# Patient Record
Sex: MALE
[De-identification: names, ages, dates, MRNs, and addresses within clinical notes are randomized per-mention and may not be internally consistent; named-entity substitution may affect disease eponyms.]

---

## 2018-11-12 ENCOUNTER — HOSPITAL ENCOUNTER (EMERGENCY)
Dept: HOSPITAL 14 - H.ER | Age: 83
Discharge: HOME | End: 2018-11-12
Payer: MEDICARE

## 2018-11-12 VITALS
RESPIRATION RATE: 18 BRPM | OXYGEN SATURATION: 98 % | SYSTOLIC BLOOD PRESSURE: 150 MMHG | TEMPERATURE: 96.7 F | HEART RATE: 75 BPM | DIASTOLIC BLOOD PRESSURE: 79 MMHG

## 2018-11-12 DIAGNOSIS — Z79.01: ICD-10-CM

## 2018-11-12 DIAGNOSIS — F03.90: ICD-10-CM

## 2018-11-12 DIAGNOSIS — S82.001A: Primary | ICD-10-CM
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
## 2018-11-12 LAB
ALBUMIN SERPL-MCNC: 4.2 G/DL (ref 3.5–5)
ALBUMIN/GLOB SERPL: 1.4 {RATIO} (ref 1–2.1)
ALT SERPL-CCNC: 45 U/L (ref 21–72)
APTT BLD: 45.2 SECONDS (ref 25.6–37.1)
AST SERPL-CCNC: 40 U/L (ref 17–59)
BASOPHILS # BLD AUTO: 0 K/UL (ref 0–0.2)
BASOPHILS NFR BLD: 0.7 % (ref 0–2)
BUN SERPL-MCNC: 25 MG/DL (ref 9–20)
CALCIUM SERPL-MCNC: 9.8 MG/DL (ref 8.4–10.2)
EOSINOPHIL # BLD AUTO: 0.1 K/UL (ref 0–0.7)
EOSINOPHIL NFR BLD: 1.6 % (ref 0–4)
ERYTHROCYTE [DISTWIDTH] IN BLOOD BY AUTOMATED COUNT: 15.8 % (ref 11.5–14.5)
GFR NON-AFRICAN AMERICAN: 48
HGB BLD-MCNC: 12.5 G/DL (ref 12–18)
INR PPP: 2.8
LYMPHOCYTES # BLD AUTO: 1.3 K/UL (ref 1–4.3)
LYMPHOCYTES NFR BLD AUTO: 19.3 % (ref 20–40)
MCH RBC QN AUTO: 29.4 PG (ref 27–31)
MCHC RBC AUTO-ENTMCNC: 32.6 G/DL (ref 33–37)
MCV RBC AUTO: 90.1 FL (ref 80–94)
MONOCYTES # BLD: 0.8 K/UL (ref 0–0.8)
MONOCYTES NFR BLD: 11.9 % (ref 0–10)
NEUTROPHILS # BLD: 4.5 K/UL (ref 1.8–7)
NEUTROPHILS NFR BLD AUTO: 66.5 % (ref 50–75)
NRBC BLD AUTO-RTO: 0 % (ref 0–0)
PLATELET # BLD: 146 K/UL (ref 130–400)
PMV BLD AUTO: 10.1 FL (ref 7.2–11.7)
PROTHROMBIN TIME: 32 SECONDS (ref 9.8–13.1)
RBC # BLD AUTO: 4.26 MIL/UL (ref 4.4–5.9)
WBC # BLD AUTO: 6.8 K/UL (ref 4.8–10.8)

## 2018-11-12 NOTE — ED PDOC
HPI: Trauma/Fall





- HPI


Time Seen by Provider: 11/12/18 15:51


Chief Complaint (Nursing): Trauma


Chief Complaint (Provider): Trauma


History Per: Patient


History/Exam Limitations: no limitations


Onset/Duration Of Symptoms: Days (x7)


Injury Occurred (Timing): Days Ago: (7)


Location Of Injury: Right: Knee, Anterior: Face


Additional Complaint(s): 





87 year old male with a history of hypertension, diabetes, mild dementia and 

atrial fibrillation presents to the ED s/p fall that happened a week ago. 

Patient reports he tripped, fell on stairs, and his head and right knee. Since 

then, he has had bruising to the face as well as knee. However, the knee has not

improved since fall. He has also developed severe swelling that extends from 

knee superiorly into thigh. Patient has difficulty maintaining extension at 

knee, but denies any numbness, difficulty speaking, headache or any other 

medical complaints. He saw his PMD today, who sent him here for further 

evaluation. 





PMD: Dr. Cerda 


Home medications: coumadin





- Fall


Fall:Prior To Injury: Tripped





Past Medical History


Reviewed: Historical Data, Nursing Documentation, Vital Signs


Vital Signs: 





                                Last Vital Signs











Temp  96.7 F L  11/12/18 15:39


 


Pulse  75   11/12/18 15:39


 


Resp  18   11/12/18 15:39


 


BP  150/79   11/12/18 15:39


 


Pulse Ox  98   11/12/18 15:39














- Medical History


PMH: Atrial Fibrillation, Dementia (mild), Diabetes, HTN





- Surgical History


Surgical History: No Surg Hx





- Family History


Family History: States: No Known Family Hx





- Social History


Current smoker - smoking cessation education provided: No


Ex-Smoker (has not smoked in the last 12 months): No


Alcohol: None


Drugs: Denies





- Home Medications


Home Medications: 


                                Ambulatory Orders











 Medication  Instructions  Recorded


 


Lidocaine 5% [Lidoderm] 1 ea TD DAILY PRN #30 patch 11/12/18


 


RX: Acetaminophen [Tylenol Extra 1,000 mg PO Q6 PRN #100 tablet 11/12/18





Strength]  














- Allergies


Allergies/Adverse Reactions: 


                                    Allergies











Allergy/AdvReac Type Severity Reaction Status Date / Time


 


No Known Allergies Allergy   Verified 11/12/18 15:38














Review of Systems


ROS Statement: Except As Marked, All Systems Reviewed And Found Negative


Musculoskeletal: Positive for: Other (head injury and right knee injury)





Physical Exam





- Reviewed


Nursing Documentation Reviewed: Yes


Vital Signs Reviewed: Yes





- Physical Exam


Appears: Positive for: In Acute Distress (mild painful )


Head Exam: Positive for: NORMOCEPHALIC.  Negative for: ATRAUMATIC


Skin: Positive for: Warm


Eye Exam: Positive for: EOMI, PERRL


Neck: Negative for: Normal (large ecchymosis on right anterior neck, no 

tenderness to palpation, no fluctuance, full ROM )


Cardiovascular/Chest: Positive for: Regular Rate, Rhythm.  Negative for: Murmur


Respiratory: Positive for: Normal Breath Sounds.  Negative for: Respiratory 

Distress


Gastrointestinal/Abdominal: Positive for: Soft.  Negative for: Tenderness


Back: Positive for: Normal Inspection.  Negative for: Decreased ROM


Extremity: Positive for: Other (Right lower extremity: Diffuse edema of the 

right anterior knee extending up to lower anterior thigh with large ecchymosis 

and tenderness to palpation, neurovascularly intact distally )


Lymphatic: Negative for: Adenopathy


Neurologic/Psych: Positive for: Alert.  Negative for: Motor/Sensory Deficits





- Laboratory Results


Result Diagrams: 


                                 11/12/18 17:20





                                 11/12/18 17:20





- ECG


O2 Sat by Pulse Oximetry: 98 (RA)


Pulse Ox Interpretation: Normal





Medical Decision Making


Medical Decision Making: 





Time: 1604


Initial Impression: Fall, right knee injury and head injury


Differential diagnoses include but are not limited to: Knee contusion, 

hamartoma, coumadin toxicity, traumatic brain injury, knee or femur fracture


Initial Plan:


--Type and screen


--CT ext lower w/o contrast right 


--CT head w/o contrast


--CMP


--CBC with differentials


--PTT


--PT


--Right knee XR


--Right femur XR





Time: 1644


CT Head:


FINDINGS:





HEMORRHAGE:


No intracranial hemorrhage. 





BRAIN:


No mass effect or edema.  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





Cortical and cerebellar atrophy, periventricular small vessel disease. 





VENTRICLES:


Unremarkable. No hydrocephalus. 





CALVARIUM:


Unremarkable.





PARANASAL SINUSES:


Unremarkable as visualized. No significant inflammatory changes.





MASTOID AIR CELLS:


Unremarkable as visualized. No inflammatory changes.





OTHER FINDINGS:


None.





IMPRESSION:


No acute intracranial abnormalities. No significant findings to account for the 

clinical presentation.








Time: 1720


CT Lower Extremity:


FINDINGS:


There is a vertical, articular fracture through the lateral portion of the 

patella, nondisplaced.  No additional fractures region although extensive 

degenerative heterotopic calcification is seen related to the superficial 

patella, medial lateral menisci as well as more peripheral portions of the 

medial lateral femorotibial joint compartments.





No subluxation or dislocation.  No underlying destructive bony lesion 

appreciable.  The proximal visualized fibula appears intact.  Diffuse osteopenia

suggests osteoporosis. Advanced degenerative joint disease seen throughout all 3

compartments including joint space narrowing, cortical sclerosis and osteophyte 

development. 





There is a moderate hemarthrosis at the suprapatellar bursa as well as medial 

and lateral femorotibial joint compartments.  No obvious reactive changes seen 

surrounding the popliteal artery which is atherosclerotic.  Prominent anterior 

knee soft tissue edema is appreciated along the dermis and subcutaneous 

distribution.





IMPRESSION:


There is a nondisplaced vertical, articular fracture through the lateral patella

with moderate hemarthrosis as discussed above. Advanced degenerative changes are

appreciated without additional fracture appreciable. No dislocation or 

subluxation evident.








Time: 1810


Right Femur XR:


FINDINGS:


No acute osseous or articular abnormalities are visible.





Soft tissue swelling about the knee.





IMPRESSION:


Soft tissue swelling.  Known fracture of the patella is not apparent on the 

current study.








Time: 1812


Right Knee XR


FINDINGS:





BONES:


Vertically oriented fracture through the lateral aspect of the patella. 





JOINTS:


Degenerative changes.  Evidence of chondrocalcinosis. 





JOINT EFFUSION:


Suprapatellar joint effusion. 





Evidence of chondromalacia patella. 





OTHER FINDINGS:


Soft tissue swelling attests to the acuity of the fracture.





IMPRESSION:


Acute fracture of the patella.  The findings are better visualized on the 

concurrent CT of the right knee.





Time: 1933


--Case discussed with Dr. Cerda, patient to be referred to Dr. Morales. Knee 

immobilization provided. Patient is stable for discharge home. 





-----------------------------------------

-------------------------------------------


Scribe Attestation: 


Documented by Babs Zuluaga, acting as a scribe for Angelina Mauro MD





Provider Scribe Attestation:


All medical record entries made by the Scribe were at my direction and 

personally dictated by me. I have reviewed the chart and agree that the record 

accurately reflects my personal performance of the history, physical exam, 

medical decision making, and the department course for this patient. I have also

personally directed, reviewed, and agree with the discharge instructions and 

disposition.





Disposition





- Clinical Impression


Clinical Impression: 


 Patella fracture








- Disposition


Referrals: 


El Morales III, MD [Staff Provider] - 11/13/18


Disposition Time: 19:33


Condition: STABLE


Additional Instructions: 


REST, ICE, IMMOBILIZATION, COMPRESSION AND ELEVATION





AVOID EXCESSIVE WALKING OR STRENUOUS ACTIVITY





CALL DR MORALES TOMORROW TO ARRANGE APPOINTMENT BY THE END OF WEEK





USE KNEE IMMOBILIZER EXCEPT FOR WHEN BATHING. USE IT UNTIL YOU FOLLOW UP WITH 

ORTHOPEDIST.


Prescriptions: 


RX: Acetaminophen [Tylenol Extra Strength] 1,000 mg PO Q6 PRN #100 tablet


 PRN Reason: FEVER OR PAIN


Lidocaine 5% [Lidoderm] 1 ea TD DAILY PRN #30 patch


 PRN Reason: PAIN


Instructions:  Knee Immobilizer (DC), Patella Fracture (DC)


Forms:  [employer] (English)

## 2018-11-12 NOTE — RAD
Date of service: 



11/12/2018



PROCEDURE:  Right femur



HISTORY:

fall large hematoma



COMPARISON:

November 12, 2018 right knee reported separately



TECHNIQUE:

Standard protocol for this study/examination.



FINDINGS:

No acute osseous or articular abnormalities are visible.



Soft tissue swelling about the knee.



IMPRESSION:

Soft tissue swelling.  Known fracture of the patella is not apparent 

on the current study.

## 2018-11-12 NOTE — RAD
Date of service: 



11/12/2018



PROCEDURE:  Right Knee Radiographs.



HISTORY:

fall large hematoma



COMPARISON:

November 12, 2018 CT right knee



FINDINGS:



BONES:

Vertically oriented fracture through the lateral aspect of the 

patella. 



JOINTS:

Degenerative changes.  Evidence of chondrocalcinosis. 



JOINT EFFUSION:

Suprapatellar joint effusion. 



Evidence of chondromalacia patella. 



OTHER FINDINGS:

Soft tissue swelling attests to the acuity of the fracture.



IMPRESSION:

Acute fracture of the patella.  The findings are better visualized on 

the concurrent CT of the right knee.

## 2018-11-12 NOTE — CT
Date of service: 



11/12/2018



PROCEDURE:  RIGHT KNEE CT WITHOUT CONTRAST



HISTORY:

large knee edema postfall r/o hemarthroma



COMPARISON:

NONE AVAILABLE.



TECHNIQUE:

A volumetric CT acquisition through the right knee was performed 

without intravenous contrast with reformatted disease as revised add 

in multiple projections by various algorithms.



Contrast Dose: None



Radiation dose:Total exam DLP = 278.29 mGy-cm.



This CT exam was performed using one or more of the following dose 

reduction techniques: Automated exposure control, adjustment of the 

mA and/or kV according to patient size, and/or use of iterative 

reconstruction technique.







FINDINGS:

There is a vertical, articular fracture through the lateral portion 

of the patella, nondisplaced.  No additional fractures region 

although extensive degenerative heterotopic calcification is seen 

related to the superficial patella, medial lateral menisci as well as 

more peripheral portions of the medial lateral femorotibial joint 

compartments.



No subluxation or dislocation.  No underlying destructive bony lesion 

appreciable.  The proximal visualized fibula appears intact.  Diffuse 

osteopenia suggests osteoporosis. Advanced degenerative joint disease 

seen throughout all 3 compartments including joint space narrowing, 

cortical sclerosis and osteophyte development. 



There is a moderate hemarthrosis at the suprapatellar bursa as well 

as medial and lateral femorotibial joint compartments.  No obvious 

reactive changes seen surrounding the popliteal artery which is 

atherosclerotic.  Prominent anterior knee soft tissue edema is 

appreciated along the dermis and subcutaneous distribution.



IMPRESSION:

There is a nondisplaced vertical, articular fracture through the 

lateral patella with moderate hemarthrosis as discussed above. 

Advanced degenerative changes are appreciated without additional 

fracture appreciable. No dislocation or subluxation evident.

## 2018-11-12 NOTE — CT
Date of service: 



11/12/2018



PROCEDURE:  CT HEAD WITHOUT CONTRAST.



HISTORY:

fall head injury on coumadin



COMPARISON:

None available.



TECHNIQUE:

Axial computed tomography images were obtained through the head/brain 

without intravenous contrast.  



Supplemental Coronal and Sagittal projections created and reviewed.



Radiation dose:



Total exam DLP = 813.95 mGy-cm.



This CT exam was performed using one or more of the following dose 

reduction techniques: Automated exposure control, adjustment of the 

mA and/or kV according to patient size, and/or use of iterative 

reconstruction technique.



FINDINGS:



HEMORRHAGE:

No intracranial hemorrhage. 



BRAIN:

No mass effect or edema.  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



Cortical and cerebellar atrophy, periventricular small vessel 

disease. 



VENTRICLES:

Unremarkable. No hydrocephalus. 



CALVARIUM:

Unremarkable.



PARANASAL SINUSES:

Unremarkable as visualized. No significant inflammatory changes.



MASTOID AIR CELLS:

Unremarkable as visualized. No inflammatory changes.



OTHER FINDINGS:

None.



IMPRESSION:

No acute intracranial abnormalities. No significant findings to 

account for the clinical presentation.

## 2019-02-14 ENCOUNTER — HOSPITAL ENCOUNTER (OUTPATIENT)
Dept: HOSPITAL 31 - C.USIC | Age: 84
End: 2019-02-14
Payer: MEDICARE

## 2019-02-14 DIAGNOSIS — N18.3: Primary | ICD-10-CM
